# Patient Record
Sex: FEMALE | Race: WHITE | NOT HISPANIC OR LATINO | Employment: UNEMPLOYED | URBAN - METROPOLITAN AREA
[De-identification: names, ages, dates, MRNs, and addresses within clinical notes are randomized per-mention and may not be internally consistent; named-entity substitution may affect disease eponyms.]

---

## 2019-11-04 ENCOUNTER — OFFICE VISIT (OUTPATIENT)
Dept: UROLOGY | Facility: CLINIC | Age: 49
End: 2019-11-04
Payer: MEDICAID

## 2019-11-04 VITALS
HEIGHT: 65 IN | RESPIRATION RATE: 18 BRPM | WEIGHT: 117 LBS | SYSTOLIC BLOOD PRESSURE: 118 MMHG | DIASTOLIC BLOOD PRESSURE: 84 MMHG | HEART RATE: 81 BPM | BODY MASS INDEX: 19.49 KG/M2

## 2019-11-04 DIAGNOSIS — N20.1 URETEROLITHIASIS: Primary | ICD-10-CM

## 2019-11-04 PROCEDURE — 99204 OFFICE O/P NEW MOD 45 MIN: CPT | Mod: 25,S$GLB,, | Performed by: SPECIALIST

## 2019-11-04 PROCEDURE — 81000 POCT URINALYSIS (W/MICRO OPTION): ICD-10-PCS | Mod: S$GLB,,, | Performed by: SPECIALIST

## 2019-11-04 PROCEDURE — 81000 URINALYSIS NONAUTO W/SCOPE: CPT | Mod: S$GLB,,, | Performed by: SPECIALIST

## 2019-11-04 PROCEDURE — 99204 PR OFFICE/OUTPT VISIT, NEW, LEVL IV, 45-59 MIN: ICD-10-PCS | Mod: 25,S$GLB,, | Performed by: SPECIALIST

## 2019-11-04 RX ORDER — VALACYCLOVIR HYDROCHLORIDE 500 MG/1
500 TABLET, FILM COATED ORAL DAILY
Refills: 6 | COMMUNITY
Start: 2019-10-24

## 2019-11-04 RX ORDER — TAMSULOSIN HYDROCHLORIDE 0.4 MG/1
1 CAPSULE ORAL DAILY
Refills: 0 | COMMUNITY
Start: 2019-10-21 | End: 2021-05-06

## 2019-11-04 RX ORDER — ESTRADIOL 1 MG/1
1 TABLET ORAL DAILY
Refills: 6 | COMMUNITY
Start: 2019-10-25 | End: 2021-05-06

## 2019-11-04 NOTE — PROGRESS NOTES
Subjective:       Patient ID: Guillermina Rosas is a 49 y.o. female.    Chief Complaint: Other (kidney stone)      HPI:  49-year-old female has never had a history of nephrolithiasis presents with a new diagnosis of a 7 mm stone in the right UPJ.  She said that she saw some blood in the urine that prompted her to take a trip to the emergency department.  She did have some pain but was not severe.  Described the pain as a dull ache with occasional pains in the this component scale of 1-10 about a 6 not radiating to any other parts of the body mostly localized in the right flank.  No nausea no vomiting no other associated symptoms other than blood in the urine.    In emergency department she was evaluated with a radiographic imaging which confirmed a 7 mm stone in the right UPJ.  I do not have the disc or the report to verify today I am going by what the patient is reporting to me.    She has belly symptomatic at the moment.    Past Medical History:   Past Medical History:   Diagnosis Date    Arthritis     Chronic pain     Fibromyalgia     Neuropathy     Restless leg syndrome        Past Surgical Historical:   Past Surgical History:   Procedure Laterality Date     SECTION      HYSTERECTOMY      partial    RHINOPLASTY      TUBAL LIGATION          Medications:   Medication List with Changes/Refills   Current Medications    BUPROPION HCL (WELLBUTRIN ORAL)    Take by mouth.    ESTRADIOL (ESTRACE) 1 MG TABLET    Take 1 mg by mouth once daily.    TAMSULOSIN (FLOMAX) 0.4 MG CAP    Take 1 capsule by mouth once daily.    VALACYCLOVIR (VALTREX) 500 MG TABLET    Take 500 mg by mouth once daily.        Past Social History:   Social History     Socioeconomic History    Marital status:      Spouse name: Not on file    Number of children: Not on file    Years of education: Not on file    Highest education level: Not on file   Occupational History    Not on file   Social Needs    Financial resource strain:  Not on file    Food insecurity:     Worry: Not on file     Inability: Not on file    Transportation needs:     Medical: Not on file     Non-medical: Not on file   Tobacco Use    Smoking status: Current Every Day Smoker     Packs/day: 0.25     Types: Cigarettes    Tobacco comment: on wellbutrin    Substance and Sexual Activity    Alcohol use: Not Currently    Drug use: Never    Sexual activity: Yes   Lifestyle    Physical activity:     Days per week: Not on file     Minutes per session: Not on file    Stress: Not on file   Relationships    Social connections:     Talks on phone: Not on file     Gets together: Not on file     Attends [employer] service: Not on file     Active member of club or organization: Not on file     Attends meetings of clubs or organizations: Not on file     Relationship status: Not on file   Other Topics Concern    Not on file   Social History Narrative    Not on file       Allergies:   Review of patient's allergies indicates:   Allergen Reactions    Hydrocodone Rash        Family History:   Family History   Problem Relation Age of Onset    No Known Problems Father     Cancer Mother         Review of Systems:  Review of Systems - General ROS: negative  Psychological ROS: negative  Ophthalmic ROS: negative  ENT ROS: negative  Allergy and Immunology ROS: negative  Hematological and Lymphatic ROS: negative  Endocrine ROS: negative  Respiratory ROS: no cough, shortness of breath, or wheezing  Cardiovascular ROS: no chest pain or dyspnea on exertion  Gastrointestinal ROS: no abdominal pain, change in bowel habits, or black or bloody stools  Genito-Urinary ROS: positive for - right flank pain  Musculoskeletal ROS: negative  Neurological ROS: no TIA or stroke symptoms  Dermatological ROS: negative     Physical Exam:  General Appearance:    Alert, cooperative, no distress, appears stated age   Head:    Normocephalic, without obvious abnormality, atraumatic   Eyes:    PERRL,  conjunctiva/corneas clear, EOM's intact, fundi     benign, both eyes   Ears:    Normal TM's and external ear canals, both ears   Nose:   Nares normal, septum midline, mucosa normal, no drainage    or sinus tenderness   Throat:   Lips, mucosa, and tongue normal; teeth and gums normal   Neck:   Supple, symmetrical, trachea midline, no adenopathy;     thyroid:  no enlargement/tenderness/nodules; no carotid    bruit or JVD   Back:     Symmetric, no curvature, ROM normal, no CVA tenderness   Lungs:     Clear to auscultation bilaterally, respirations unlabored   Chest Wall:    No tenderness or deformity    Heart:    Regular rate and rhythm, S1 and S2 normal, no murmur, rub   or gallop   Breast Exam:    No tenderness, masses, or nipple abnormality   Abdomen:     Soft, non-tender, bowel sounds active all four quadrants,     no masses, no organomegaly   Genitalia:    Deferred   Rectal:    Deferred   Extremities:   Extremities normal, atraumatic, no cyanosis or edema   Pulses:   2+ and symmetric all extremities   Skin:   Skin color, texture, turgor normal, no rashes or lesions   Lymph nodes:   Cervical, supraclavicular, and axillary nodes normal   Neurologic:   CNII-XII intact, normal strength, sensation and reflexes     throughout         Assessment/Plan:       49-year-old female with a new diagnosis of an obstructing right proximal ureteral calculus.  1.  Patient is not symptomatic at all the moment so I am going to elect for conservative therapy for now.  2.  She is going to drop of the disc for me tomorrow for my review if after reviewing the disc have feel like we need to proceed in another fashion of going to call the patient  3.  Return to see me in clinic in several weeks.  4.  We talked about fluid hydration and relax in in the bath therapy and tub a look warm water when she gets home.    Problem List Items Addressed This Visit        Renal/    Ureterolithiasis - Primary

## 2019-11-11 LAB
BILIRUB UR QL STRIP: NEGATIVE
GLUCOSE UR QL STRIP: NEGATIVE
KETONES UR QL STRIP: NEGATIVE
LEUKOCYTE ESTERASE UR QL STRIP: NEGATIVE
PH, POC UA: 5.5
POC AMORP, URINE: 0
POC BACTI, URINE: 0
POC BLOOD, URINE: POSITIVE
POC CASTS, URINE: 0
POC CRYST, URINE: 0
POC EPITH, URINE: 0
POC HCG, URINE: 0
POC HYALIN, URINE: 0 LPF
POC MUCUS, URINE: 0
POC NITRATES, URINE: NEGATIVE
POC OTHER, URINE: 0
POC RBC, URINE: 0 HPF
POC WBC, URINE: 0 HPF
PROT UR QL STRIP: NEGATIVE
SP GR UR STRIP: 1 (ref 1–1.03)
UROBILINOGEN UR STRIP-ACNC: 0.2 (ref 0.1–1.1)

## 2019-11-20 ENCOUNTER — TELEPHONE (OUTPATIENT)
Dept: UROLOGY | Facility: CLINIC | Age: 49
End: 2019-11-20

## 2019-11-20 NOTE — TELEPHONE ENCOUNTER
Called and spoke with patient in regards to stone. Informed patient that stone would be observed with no intervention unless patient is experiencing symptoms or desires to have stone removed per Dr. Abdullahi. Patient verbalized severe discomfort and wants to have stone removed. Will notify Dr. Abdullahi.

## 2019-11-23 DIAGNOSIS — N20.1 URETEROLITHIASIS: Primary | ICD-10-CM

## 2019-11-26 ENCOUNTER — OUTSIDE PLACE OF SERVICE (OUTPATIENT)
Dept: UROLOGY | Facility: CLINIC | Age: 49
End: 2019-11-26
Payer: MEDICAID

## 2019-11-26 LAB
BASOPHILS NFR SNV MANUAL: 0.8 % (ref 0–3)
BUN SERPL-MCNC: 6 MG/DL (ref 7–18)
BUN/CREAT SERPL: 7.31 RATIO (ref 7–18)
CALCIUM SERPL-MCNC: 8.7 MG/DL (ref 8.8–10.5)
CHLORIDE SERPL-SCNC: 105 MMOL/L (ref 100–108)
CO2 SERPL-SCNC: 27 MMOL/L (ref 21–32)
CREAT SERPL-MCNC: 0.82 MG/DL (ref 0.55–1.02)
EOSINOPHIL NFR SNV MANUAL: 1.8 % (ref 1–3)
ERYTHROCYTE [DISTWIDTH] IN BLOOD BY AUTOMATED COUNT: 14.8 % (ref 12.5–18)
GFR ESTIMATION: > 60
GLUCOSE SERPL-MCNC: 82 MG/DL (ref 70–110)
HCT VFR BLD AUTO: 40 % (ref 37–47)
HGB BLD-MCNC: 13.3 G/DL (ref 12–16)
LYMPHOCYTES NFR SNV MANUAL: 36.7 % (ref 25–40)
MANUAL NRBC PER 100 CELLS: 0 %
MCH RBC QN AUTO: 30.5 PG (ref 27–31.2)
MCHC RBC AUTO-ENTMCNC: 33.3 G/DL (ref 31.8–35.4)
MCV RBC AUTO: 91.7 FL (ref 80–97)
MONOCYTES/100 LEUKOCYTES: 8.7 % (ref 1–15)
NEUTROPHILS NFR BLD: 3.09 10*3/UL (ref 1.8–7.7)
NEUTROPHILS NFR SNV MANUAL: 51.7 % (ref 37–80)
PLATELETS: 158 10*3/UL (ref 142–424)
POTASSIUM SERPL-SCNC: 4.8 MMOL/L (ref 3.6–5.2)
RBC # BLD AUTO: 4.36 10*6/UL (ref 4.2–5.4)
SODIUM BLD-SCNC: 140 MMOL/L (ref 135–145)
WBC # BLD: 6 10*3/UL (ref 4.6–10.2)

## 2019-11-26 PROCEDURE — 52356 PR CYSTO/URETERO W/LITHOTRIPSY: ICD-10-PCS | Mod: RT,,, | Performed by: SPECIALIST

## 2019-11-26 PROCEDURE — 74420 UROGRAPHY RTRGR +-KUB: CPT | Mod: 26,,, | Performed by: SPECIALIST

## 2019-11-26 PROCEDURE — 52356 CYSTO/URETERO W/LITHOTRIPSY: CPT | Mod: RT,,, | Performed by: SPECIALIST

## 2019-11-26 PROCEDURE — 74420 PR  X-RAY RETROGRADE PYELOGRAM: ICD-10-PCS | Mod: 26,,, | Performed by: SPECIALIST

## 2019-12-01 LAB
CALCULI COMPOSITION: NORMAL
CALCULI DESCRIPTION: NORMAL
CALCULI MASS: 51 MG
CALCULI NUMBER: 4
CALCULI PDF: NORMAL
CALCULI SIZE: NORMAL MM

## 2019-12-02 ENCOUNTER — TELEPHONE (OUTPATIENT)
Dept: UROLOGY | Facility: CLINIC | Age: 49
End: 2019-12-02

## 2019-12-02 NOTE — TELEPHONE ENCOUNTER
Pt had questions about removing stent herself, answered them and informed of what to do. Pt req we call out more pain meds since she is out and in pain/will be in pain when she removes stent. informed her I would ask ADAM but if not to take OTC pain reliever (tylenol, ibuprofen etc. )    ----- Message from Shanell Chinchilla sent at 12/2/2019 10:22 AM CST -----  Contact: pt  Pt has question about taking her stint out and also about more medication. Please call back pt [phone].

## 2019-12-04 ENCOUNTER — TELEPHONE (OUTPATIENT)
Dept: UROLOGY | Facility: CLINIC | Age: 49
End: 2019-12-04

## 2019-12-04 NOTE — TELEPHONE ENCOUNTER
Paperwork faxed to Ms. Carpio per request. Fax confirmation received. Otilia LEVIN    ----- Message from Kennedi Alvarez sent at 12/4/2019  8:37 AM CST -----  Contact: Mir  Ms. Carpio called In regards to get clinic notes fax over for the patient last visit . Fax number is [phone]. Please call back if needed at [phone]. Ext [phone].  Ms. Carpio stated she faxed over many request and never received anything .    Thanks,  Kennedi Alvarez

## 2020-01-09 ENCOUNTER — OFFICE VISIT (OUTPATIENT)
Dept: FAMILY MEDICINE | Facility: CLINIC | Age: 50
End: 2020-01-09
Payer: MEDICAID

## 2020-01-09 VITALS
TEMPERATURE: 98 F | BODY MASS INDEX: 19.74 KG/M2 | HEIGHT: 65 IN | WEIGHT: 118.5 LBS | OXYGEN SATURATION: 99 % | SYSTOLIC BLOOD PRESSURE: 115 MMHG | RESPIRATION RATE: 18 BRPM | HEART RATE: 70 BPM | DIASTOLIC BLOOD PRESSURE: 72 MMHG

## 2020-01-09 DIAGNOSIS — B18.1 CHRONIC VIRAL HEPATITIS B WITHOUT DELTA AGENT AND WITHOUT COMA: ICD-10-CM

## 2020-01-09 DIAGNOSIS — B18.2 HEP C W/O COMA, CHRONIC: Primary | ICD-10-CM

## 2020-01-09 PROCEDURE — 99203 PR OFFICE/OUTPT VISIT, NEW, LEVL III, 30-44 MIN: ICD-10-PCS | Mod: S$GLB,,, | Performed by: INTERNAL MEDICINE

## 2020-01-09 PROCEDURE — 99203 OFFICE O/P NEW LOW 30 MIN: CPT | Mod: S$GLB,,, | Performed by: INTERNAL MEDICINE

## 2020-01-09 NOTE — PROGRESS NOTES
Subjective:       Patient ID: Guillermina Rosas is a 49 y.o. female.    Chief Complaint: Hepatitis C (INITIAL CONSULTATION )    Asked to see regarding hepatitis-C.  Patient reports that she was diagnosed in approximately 2013.  She reports that she had a family friend who was hep C positive that she helped with wound care without gloves and thinks that this may have been how she was exposed.  Her  was also positive for hepatitis-C.  She has never been treated in the past.  She had a viral load of 21,000,000, genotype 3.  Lab testing did not show any evidence of cirrhosis, she was activity stage III, fibrosis stage I.  She denies any known history of complications related to this.  No history of GI bleeding, no history of jaundice or scleral icterus.  She also has history of kidney stones status post recent urologic procedure.  She denies any pain at this time.  No recent fever chills.  She is very interested in treatment and getting started on this as soon as possible.    Her lab testing also showed an incidental finding of a positive hepatitis B surface antigen.  She denies any prior knowledge of being diagnosed with hepatitis-B.  She denies any known exposures to hepatitis-B.  She is interested in treatment for this if it is appropriate.    Review of Systems   Constitutional: Negative for chills and fever.   HENT: Negative for ear pain, rhinorrhea and sore throat.    Eyes: Negative for pain and visual disturbance.   Respiratory: Negative for cough, shortness of breath and wheezing.    Cardiovascular: Negative for chest pain and palpitations.   Gastrointestinal: Negative for abdominal pain, constipation, diarrhea, nausea and vomiting.   Endocrine: Negative for cold intolerance and heat intolerance.   Genitourinary: Negative for difficulty urinating, dysuria and hematuria.   Musculoskeletal: Negative for back pain, joint swelling and myalgias.   Skin: Negative for rash and wound.   Neurological: Negative for  dizziness, weakness and headaches.   Psychiatric/Behavioral: Negative for agitation and confusion.       Objective:      Physical Exam   Constitutional: She is oriented to person, place, and time. She appears well-developed and well-nourished.   HENT:   Head: Normocephalic and atraumatic.   Eyes: Pupils are equal, round, and reactive to light. No scleral icterus.   Neck: Neck supple. No tracheal deviation present.   Cardiovascular: Normal rate, regular rhythm and normal heart sounds.   No murmur heard.  Pulmonary/Chest: Effort normal and breath sounds normal. No respiratory distress. She has no wheezes.   Abdominal: Soft. Bowel sounds are normal. She exhibits no distension. There is no tenderness. There is no guarding.   Musculoskeletal: Normal range of motion. She exhibits no edema or tenderness.   Neurological: She is alert and oriented to person, place, and time.   Skin: Skin is warm and dry. No rash noted.   Psychiatric: She has a normal mood and affect. Her behavior is normal.   Nursing note and vitals reviewed.      Assessment:       1. Hep C w/o coma, chronic    2. Chronic viral hepatitis B without delta agent and without coma        Plan:       Problem List Items Addressed This Visit        GI    Hep C w/o coma, chronic - Primary     Genotype 3, viral load over 21,000,000. Activity stage III fibrosis stage I.  Will start treatment with Epclusa.  Discussed importance of 100% compliance with therapy in order to have successful treatment.  We will check labs senior living through therapy, if he has not had complete response will stop therapy at that time.  Will need to treat for 12 weeks.         Chronic viral hepatitis B without delta agent and without coma     Need to get more testing including viral load, E antigen to see if this is accurate versus a false positive.  Will likely discuss treatment if needed after hepatitis C treatment is complete.         Relevant Orders    Hepatitis B Viral DNA, Quantitative     Hepatitis B surface antibody    Hepatitis B e antigen

## 2020-01-09 NOTE — ASSESSMENT & PLAN NOTE
Genotype 3, viral load over 21,000,000. Activity stage III fibrosis stage I.  Will start treatment with Epclusa.  Discussed importance of 100% compliance with therapy in order to have successful treatment.  We will check labs USP through therapy, if he has not had complete response will stop therapy at that time.  Will need to treat for 12 weeks.

## 2020-01-09 NOTE — ASSESSMENT & PLAN NOTE
Need to get more testing including viral load, E antigen to see if this is accurate versus a false positive.  Will likely discuss treatment if needed after hepatitis C treatment is complete.

## 2020-01-09 NOTE — LETTER
January 9, 2020      Rufina Galvan, MIGUEL  [address] [address]           [place] - [facility]/Infectious Disease  [facility] [facility] [address]  Phone: [phone]  Fax: [phone]          Patient: Guillermina Rosas   MR Number: [id number]   YOB: 1970   Date of Visit: 1/9/2020       Dear Rufina Galvan:    Thank you for referring Guillermina Rosas to me for evaluation. Attached you will find relevant portions of my assessment and plan of care.    If you have questions, please do not hesitate to call me. I look forward to following Guillermina Rosas along with you.    Sincerely,    Maye Garland, LPN    Enclosure  CC:  No Recipients    If you would like to receive this communication electronically, please contact [email or web address] or [phone] to request more information on [employer] Link access.    For providers and/or their staff who would like to refer a patient to [facility], please contact us through our one-stop-shop provider referral line, [facility], at [phone].    If you feel you have received this communication in error or would no longer like to receive these types of communications, please e-mail [email or web address]

## 2020-06-18 ENCOUNTER — TELEPHONE (OUTPATIENT)
Dept: FAMILY MEDICINE | Facility: CLINIC | Age: 50
End: 2020-06-18

## 2020-06-18 NOTE — TELEPHONE ENCOUNTER
Spoke w/ ORI nurse and she stated pt wants to get her labs done that were sent out in January. Tried calling pt's cell and home # to verify what lab she wants to use.

## 2021-05-06 ENCOUNTER — OFFICE VISIT (OUTPATIENT)
Dept: FAMILY MEDICINE | Facility: CLINIC | Age: 51
End: 2021-05-06
Payer: MEDICAID

## 2021-05-06 VITALS
RESPIRATION RATE: 16 BRPM | HEART RATE: 99 BPM | DIASTOLIC BLOOD PRESSURE: 80 MMHG | WEIGHT: 136 LBS | SYSTOLIC BLOOD PRESSURE: 126 MMHG | OXYGEN SATURATION: 99 % | BODY MASS INDEX: 22.63 KG/M2

## 2021-05-06 DIAGNOSIS — G89.29 CHRONIC NONINTRACTABLE HEADACHE, UNSPECIFIED HEADACHE TYPE: ICD-10-CM

## 2021-05-06 DIAGNOSIS — B18.1 CHRONIC VIRAL HEPATITIS B WITHOUT DELTA AGENT AND WITHOUT COMA: Primary | ICD-10-CM

## 2021-05-06 DIAGNOSIS — B18.2 HEP C W/O COMA, CHRONIC: ICD-10-CM

## 2021-05-06 DIAGNOSIS — R51.9 CHRONIC NONINTRACTABLE HEADACHE, UNSPECIFIED HEADACHE TYPE: ICD-10-CM

## 2021-05-06 DIAGNOSIS — M54.31 SCIATIC NERVE PAIN, RIGHT: ICD-10-CM

## 2021-05-06 DIAGNOSIS — M54.41 RIGHT-SIDED LOW BACK PAIN WITH RIGHT-SIDED SCIATICA, UNSPECIFIED CHRONICITY: ICD-10-CM

## 2021-05-06 DIAGNOSIS — G62.9 NEUROPATHY: ICD-10-CM

## 2021-05-06 DIAGNOSIS — N31.9 BLADDER DYSFUNCTION: ICD-10-CM

## 2021-05-06 PROCEDURE — 99205 PR OFFICE/OUTPT VISIT, NEW, LEVL V, 60-74 MIN: ICD-10-PCS | Mod: S$GLB,,, | Performed by: NURSE PRACTITIONER

## 2021-05-06 PROCEDURE — 99205 OFFICE O/P NEW HI 60 MIN: CPT | Mod: S$GLB,,, | Performed by: NURSE PRACTITIONER

## 2021-05-06 RX ORDER — GABAPENTIN 600 MG/1
TABLET ORAL
COMMUNITY
Start: 2021-04-23

## 2021-05-06 RX ORDER — METHYLPREDNISOLONE ACETATE 80 MG/ML
80 INJECTION, SUSPENSION INTRA-ARTICULAR; INTRALESIONAL; INTRAMUSCULAR; SOFT TISSUE
Status: COMPLETED | OUTPATIENT
Start: 2021-05-06 | End: 2021-05-06

## 2021-05-06 RX ORDER — CYCLOBENZAPRINE HCL 10 MG
10 TABLET ORAL 3 TIMES DAILY PRN
Qty: 30 TABLET | Refills: 0 | Status: SHIPPED | OUTPATIENT
Start: 2021-05-06 | End: 2021-05-18

## 2021-05-06 RX ORDER — ALPRAZOLAM 2 MG/1
2 TABLET ORAL EVERY 8 HOURS PRN
COMMUNITY
Start: 2021-03-23

## 2021-05-06 RX ADMIN — METHYLPREDNISOLONE ACETATE 80 MG: 80 INJECTION, SUSPENSION INTRA-ARTICULAR; INTRALESIONAL; INTRAMUSCULAR; SOFT TISSUE at 11:05

## 2021-05-20 ENCOUNTER — OFFICE VISIT (OUTPATIENT)
Dept: FAMILY MEDICINE | Facility: CLINIC | Age: 51
End: 2021-05-20
Payer: MEDICAID

## 2021-05-20 VITALS
HEART RATE: 72 BPM | RESPIRATION RATE: 14 BRPM | OXYGEN SATURATION: 99 % | SYSTOLIC BLOOD PRESSURE: 114 MMHG | DIASTOLIC BLOOD PRESSURE: 65 MMHG

## 2021-05-20 DIAGNOSIS — B18.2 HEP C W/O COMA, CHRONIC: Primary | ICD-10-CM

## 2021-05-20 DIAGNOSIS — B18.1 CHRONIC VIRAL HEPATITIS B WITHOUT DELTA AGENT AND WITHOUT COMA: ICD-10-CM

## 2021-05-20 PROCEDURE — 99213 OFFICE O/P EST LOW 20 MIN: CPT | Mod: S$GLB,,, | Performed by: INTERNAL MEDICINE

## 2021-05-20 PROCEDURE — 99213 PR OFFICE/OUTPT VISIT, EST, LEVL III, 20-29 MIN: ICD-10-PCS | Mod: S$GLB,,, | Performed by: INTERNAL MEDICINE

## 2021-05-20 RX ORDER — VELPATASVIR AND SOFOSBUVIR 100; 400 MG/1; MG/1
1 TABLET, FILM COATED ORAL DAILY
Qty: 28 TABLET | Refills: 2 | Status: SHIPPED | OUTPATIENT
Start: 2021-05-20 | End: 2021-06-17

## 2021-05-21 ENCOUNTER — OFFICE VISIT (OUTPATIENT)
Dept: FAMILY MEDICINE | Facility: CLINIC | Age: 51
End: 2021-05-21
Payer: MEDICAID

## 2021-05-21 VITALS
RESPIRATION RATE: 14 BRPM | OXYGEN SATURATION: 96 % | SYSTOLIC BLOOD PRESSURE: 115 MMHG | DIASTOLIC BLOOD PRESSURE: 67 MMHG | HEART RATE: 80 BPM

## 2021-05-21 DIAGNOSIS — F41.9 ANXIETY: ICD-10-CM

## 2021-05-21 DIAGNOSIS — F31.9 BIPOLAR AFFECTIVE DISORDER, REMISSION STATUS UNSPECIFIED: Primary | ICD-10-CM

## 2021-05-21 DIAGNOSIS — F11.20 LONG-TERM CURRENT USE OF METHADONE FOR OPIATE DEPENDENCE: ICD-10-CM

## 2021-05-21 DIAGNOSIS — Z09 FOLLOW-UP EXAM: ICD-10-CM

## 2021-05-21 PROCEDURE — 99214 PR OFFICE/OUTPT VISIT, EST, LEVL IV, 30-39 MIN: ICD-10-PCS | Mod: S$GLB,,, | Performed by: NURSE PRACTITIONER

## 2021-05-21 PROCEDURE — 99214 OFFICE O/P EST MOD 30 MIN: CPT | Mod: S$GLB,,, | Performed by: NURSE PRACTITIONER

## 2021-05-31 ENCOUNTER — TELEPHONE (OUTPATIENT)
Dept: FAMILY MEDICINE | Facility: CLINIC | Age: 51
End: 2021-05-31

## 2021-06-22 ENCOUNTER — TELEPHONE (OUTPATIENT)
Dept: FAMILY MEDICINE | Facility: CLINIC | Age: 51
End: 2021-06-22

## 2021-06-25 ENCOUNTER — TELEPHONE (OUTPATIENT)
Dept: FAMILY MEDICINE | Facility: CLINIC | Age: 51
End: 2021-06-25

## 2022-01-28 ENCOUNTER — TELEPHONE (OUTPATIENT)
Dept: SMOKING CESSATION | Facility: CLINIC | Age: 52
End: 2022-01-28
Payer: MEDICAID

## 2022-01-28 ENCOUNTER — CLINICAL SUPPORT (OUTPATIENT)
Dept: SMOKING CESSATION | Facility: CLINIC | Age: 52
End: 2022-01-28
Payer: COMMERCIAL

## 2022-01-28 DIAGNOSIS — F17.200 NICOTINE DEPENDENCE: Primary | ICD-10-CM

## 2022-01-28 PROCEDURE — 99404 PR PREVENT COUNSEL,INDIV,60 MIN: ICD-10-PCS | Mod: S$GLB,,, | Performed by: GENERAL PRACTICE

## 2022-01-28 PROCEDURE — 99404 PREV MED CNSL INDIV APPRX 60: CPT | Mod: S$GLB,,, | Performed by: GENERAL PRACTICE

## 2022-01-28 RX ORDER — MICONAZOLE NITRATE 2 %
2 CREAM (GRAM) TOPICAL
Qty: 144 EACH | Refills: 0 | Status: SHIPPED | OUTPATIENT
Start: 2022-01-28

## 2022-01-28 RX ORDER — METHADONE HYDROCHLORIDE 10 MG/1
60 TABLET ORAL DAILY
COMMUNITY

## 2022-01-28 RX ORDER — BUPROPION HYDROCHLORIDE 150 MG/1
150 TABLET, EXTENDED RELEASE ORAL 2 TIMES DAILY
Qty: 60 TABLET | Refills: 0 | Status: SHIPPED | OUTPATIENT
Start: 2022-01-28 | End: 2023-01-28

## 2022-01-28 NOTE — TELEPHONE ENCOUNTER
Attempted to contact patient regarding missed [facility] appointment.  Patient's phone stated that she was not receiving calls at this time.

## 2022-02-04 ENCOUNTER — CLINICAL SUPPORT (OUTPATIENT)
Dept: SMOKING CESSATION | Facility: CLINIC | Age: 52
End: 2022-02-04
Payer: COMMERCIAL

## 2022-02-04 DIAGNOSIS — F17.200 NICOTINE DEPENDENCE: Primary | ICD-10-CM

## 2022-02-04 PROCEDURE — 99403 PR PREVENT COUNSEL,INDIV,45 MIN: ICD-10-PCS | Mod: S$GLB,,, | Performed by: GENERAL PRACTICE

## 2022-02-04 PROCEDURE — 99403 PREV MED CNSL INDIV APPRX 45: CPT | Mod: S$GLB,,, | Performed by: GENERAL PRACTICE

## 2022-02-04 NOTE — PROGRESS NOTES
Individual Follow-Up Form    2/4/2022    Clinical Status of Patient: Outpatient    Length of Service: 45 minutes    Continuing Medication: yes  Wellbutrin    Other Medications: Nicotine Gum     Target Symptoms: Withdrawal and medication side effects. The following were  rated moderate (3) to severe (4) on TCRS:  · Moderate (3): none reported  · Severe (4): none reported    Comments: Spoke with patient at length in clinic regarding tobacco cessation progress. Patient remains on prescribed tobacco cessation medication 150mg Wellbutrin and 2mg nicotine gum without any negative side effects at this time.  Patient decreased to 3 cigarettes daily.  Patient will cut down to 2 cigarettes a day this week.  Counselor reviewed strategies, cues, and triggers. Introduced the negative impact of tobacco on health, the health advantages of discontinuing the use of tobacco, time line improved health changes after a quit, withdrawal issues to expect from nicotine and habit, and ways to achieve the goal of a quit.  Counselor provided patient with fidget devices to help occupy her time and keep her hands busy to deter her from smoking.  Patient appeared to be receptive to the information given.  Counselor will continue to motivate patient to be tobacco free.    Diagnosis: F17.200    Next Visit: 1 week

## 2022-02-09 ENCOUNTER — OFFICE VISIT (OUTPATIENT)
Dept: UROLOGY | Facility: CLINIC | Age: 52
End: 2022-02-09
Payer: MEDICAID

## 2022-02-09 VITALS — HEIGHT: 65 IN | BODY MASS INDEX: 21.99 KG/M2 | WEIGHT: 132 LBS

## 2022-02-09 DIAGNOSIS — N39.46 MIXED INCONTINENCE URGE AND STRESS: Primary | ICD-10-CM

## 2022-02-09 LAB — POC RESIDUAL URINE VOLUME: 0 ML (ref 0–100)

## 2022-02-09 PROCEDURE — 1159F MED LIST DOCD IN RCRD: CPT | Mod: CPTII,S$GLB,, | Performed by: NURSE PRACTITIONER

## 2022-02-09 PROCEDURE — 3008F BODY MASS INDEX DOCD: CPT | Mod: CPTII,S$GLB,, | Performed by: NURSE PRACTITIONER

## 2022-02-09 PROCEDURE — 1160F PR REVIEW ALL MEDS BY PRESCRIBER/CLIN PHARMACIST DOCUMENTED: ICD-10-PCS | Mod: CPTII,S$GLB,, | Performed by: NURSE PRACTITIONER

## 2022-02-09 PROCEDURE — 99214 PR OFFICE/OUTPT VISIT, EST, LEVL IV, 30-39 MIN: ICD-10-PCS | Mod: S$GLB,,, | Performed by: NURSE PRACTITIONER

## 2022-02-09 PROCEDURE — 51798 US URINE CAPACITY MEASURE: CPT | Mod: S$GLB,,, | Performed by: NURSE PRACTITIONER

## 2022-02-09 PROCEDURE — 51798 POCT BLADDER SCAN: ICD-10-PCS | Mod: S$GLB,,, | Performed by: NURSE PRACTITIONER

## 2022-02-09 PROCEDURE — 1159F PR MEDICATION LIST DOCUMENTED IN MEDICAL RECORD: ICD-10-PCS | Mod: CPTII,S$GLB,, | Performed by: NURSE PRACTITIONER

## 2022-02-09 PROCEDURE — 99214 OFFICE O/P EST MOD 30 MIN: CPT | Mod: S$GLB,,, | Performed by: NURSE PRACTITIONER

## 2022-02-09 PROCEDURE — 1160F RVW MEDS BY RX/DR IN RCRD: CPT | Mod: CPTII,S$GLB,, | Performed by: NURSE PRACTITIONER

## 2022-02-09 PROCEDURE — 3008F PR BODY MASS INDEX (BMI) DOCUMENTED: ICD-10-PCS | Mod: CPTII,S$GLB,, | Performed by: NURSE PRACTITIONER

## 2022-02-09 RX ORDER — SOLIFENACIN SUCCINATE 10 MG/1
10 TABLET, FILM COATED ORAL DAILY
Qty: 30 TABLET | Refills: 11 | Status: SHIPPED | OUTPATIENT
Start: 2022-02-09 | End: 2023-02-09

## 2022-02-09 NOTE — PROGRESS NOTES
"Subjective:       Patient ID: Guillermina Rosas is a 51 y.o. female.    Chief Complaint: urinary incontience      HPI: 51-year-old patient established known to Dr. Abdullahi she had a previous right ureteral stone that he did stone extraction on back in 2019.  She has had no recurrence stone type symptoms until this past week in which she states she passed a small kidney stone.  She is asymptomatic in reference to that today.  Her main reason for evaluation today is mixed urinary urge/stress incontinence.  Her last gyn exam was a year ago.  Her gyn states she had a bladder prolapse and needed to see a urologist.  On presentation today states her symptoms been ongoing for years.  She has urinary frequency urgency with urge incontinence she also has incontinence with cough laugh sneeze and position change.  She wears 6-7 pads per day.  She wait awakens wet daily.  Denies any dysuria.  No gross hematuria.  No flank pain.  Mild constipation.  Patient states her to gyn in previously tried her on oxybutynin and failed.  Gyn history  2 para 2 1 vaginal delivery/1        Past Medical History:   Past Medical History:   Diagnosis Date    Arthritis     Chronic pain     Chronic pain     Fibromyalgia     Neuropathy     Restless leg syndrome     Restless leg syndrome     Scoliosis        Past Surgical Historical:   Past Surgical History:   Procedure Laterality Date     SECTION  2003    CYSTOSCOPY WITH CALCULUS EXTRACTION Right 2019    HYSTERECTOMY      partial    RHINOPLASTY      RHINOPLASTY      3 "NOSE SURGERIES"    TUBAL LIGATION          Medications:   Medication List with Changes/Refills   New Medications    SOLIFENACIN (VESICARE) 10 MG TABLET    Take 1 tablet (10 mg total) by mouth once daily.   Current Medications    ALPRAZOLAM (XANAX) 2 MG TAB    Take 2 mg by mouth every 8 (eight) hours as needed.    BUPROPION (WELLBUTRIN SR) 150 MG TBSR 12 HR TABLET    Take 1 tablet (150 mg total) by " mouth 2 (two) times daily.    CYCLOBENZAPRINE (FLEXERIL) 10 MG TABLET    TAKE ONE TABLET BY MOUTH THREE TIMES DAILY AS NEEDED FOR MUSCLE SPASMS    GABAPENTIN (NEURONTIN) 600 MG TABLET    TAKE ONE TABLET BY MOUTH THREE TIMES DAILY FOR 30 DAYS    METHADONE (DOLOPHINE) 10 MG TABLET    Take 60 mg by mouth Daily.    NICOTINE, POLACRILEX, (NICORETTE) 2 MG GUM    Take 1 each (2 mg total) by mouth as needed (Do not take more than 10 pieces in 24 hours).    VALACYCLOVIR (VALTREX) 500 MG TABLET    Take 500 mg by mouth once daily.        Past Social History:   Social History     Socioeconomic History    Marital status:     Number of children: 2   Occupational History    Occupation: NA    Tobacco Use    Smoking status: Current Every Day Smoker     Packs/day: 0.25     Types: Cigarettes    Smokeless tobacco: Never Used    Tobacco comment: on wellbutrin DOWN TO 8/DAY    Substance and Sexual Activity    Alcohol use: Not Currently    Drug use: Never    Sexual activity: Yes       Allergies:   Review of patient's allergies indicates:   Allergen Reactions    Hydrocodone Rash        Family History:   Family History   Problem Relation Age of Onset    Heart disease Father     Diabetes Father     Cancer Mother     Throat cancer Mother     Diabetes Paternal Aunt     Diabetes Paternal Uncle     Lymphoma Maternal Grandmother         Review of Systems:  Review of Systems   Constitutional: Negative for activity change and appetite change.   HENT: Negative for congestion and dental problem.    Respiratory: Negative for chest tightness and shortness of breath.    Cardiovascular: Negative for chest pain.   Gastrointestinal: Negative for abdominal distention and abdominal pain.   Genitourinary: Positive for enuresis. Negative for decreased urine volume, difficulty urinating, dyspareunia, dysuria, flank pain, frequency, genital sores, hematuria, pelvic pain and urgency.   Musculoskeletal: Negative for back pain and neck pain.    Allergic/Immunologic: Negative for immunocompromised state.   Neurological: Negative for dizziness.   Hematological: Negative for adenopathy.   Psychiatric/Behavioral: Negative for agitation, behavioral problems and confusion.       Physical Exam:  Physical Exam  Constitutional:       Appearance: She is well-developed and well-nourished.   HENT:      Head: Normocephalic and atraumatic.   Eyes:      General: No scleral icterus.  Cardiovascular:      Pulses: Intact distal pulses.   Pulmonary:      Effort: Pulmonary effort is normal.      Breath sounds: Normal breath sounds.   Abdominal:      General: There is no distension.      Palpations: Abdomen is soft.      Tenderness: There is no abdominal tenderness.   Genitourinary:     Exam position: Supine.      Labia:         Right: No rash, tenderness or lesion.         Left: No rash, tenderness or lesion.       Vagina: Normal.      Rectum: Normal.      Comments: Deferred at this time  Musculoskeletal:         General: No edema.      Cervical back: Normal range of motion.   Skin:     General: Skin is warm and dry.   Neurological:      Mental Status: She is alert and oriented to person, place, and time.   Psychiatric:         Mood and Affect: Mood and affect normal.         Assessment/Plan:   Mixed incontinence--urinalysis is negative, PVR 0 mL.  Patient is failed oxybutynin in the care of her gyn.  Trial of VESIcare 10 mg 1 p.o. daily Rx sent to pharmacy of record.  I explained patient I would like to get her urge component of the incontinence under control and then re-evaluate how severe the stress component is.  If that remains I at that time I will schedule her for cystoscopy and pelvic exam in the care of Dr. Hinton as a possible candidate for surgical  Problem List Items Addressed This Visit    None     Visit Diagnoses     Mixed incontinence urge and stress    -  Primary    Relevant Orders    POCT Bladder Scan    POCT Urinalysis (w/Micro Option)

## 2022-02-25 ENCOUNTER — TELEPHONE (OUTPATIENT)
Dept: SMOKING CESSATION | Facility: CLINIC | Age: 52
End: 2022-02-25
Payer: MEDICAID

## 2022-02-25 NOTE — TELEPHONE ENCOUNTER
Attempted to contact patient regarding her missed follow up appointment.  Patient did not answer.  Counselor left a voice message with contact information.

## 2022-03-04 ENCOUNTER — TELEPHONE (OUTPATIENT)
Dept: SMOKING CESSATION | Facility: CLINIC | Age: 52
End: 2022-03-04
Payer: MEDICAID

## 2022-03-04 NOTE — TELEPHONE ENCOUNTER
Attempted to contact patient regarding missed follow up appointment.  Patient did not answer.  Counselor was unable to leave a voice message.

## 2022-03-07 ENCOUNTER — CLINICAL SUPPORT (OUTPATIENT)
Dept: SMOKING CESSATION | Facility: CLINIC | Age: 52
End: 2022-03-07
Payer: COMMERCIAL

## 2022-03-07 DIAGNOSIS — F17.200 NICOTINE DEPENDENCE: Primary | ICD-10-CM

## 2022-03-07 PROCEDURE — 99406 BEHAV CHNG SMOKING 3-10 MIN: CPT | Mod: S$GLB,,, | Performed by: GENERAL PRACTICE

## 2022-03-07 PROCEDURE — 99406 PR TOBACCO USE CESSATION INTERMEDIATE 3-10 MINUTES: ICD-10-PCS | Mod: S$GLB,,, | Performed by: GENERAL PRACTICE

## 2022-05-18 ENCOUNTER — CLINICAL SUPPORT (OUTPATIENT)
Dept: SMOKING CESSATION | Facility: CLINIC | Age: 52
End: 2022-05-18
Payer: COMMERCIAL

## 2022-05-18 DIAGNOSIS — F17.200 NICOTINE DEPENDENCE: Primary | ICD-10-CM

## 2022-05-18 PROCEDURE — 99999 PR PBB SHADOW E&M-EST. PATIENT-LVL I: ICD-10-PCS | Mod: PBBFAC,,,

## 2022-05-18 PROCEDURE — 99999 PR PBB SHADOW E&M-EST. PATIENT-LVL I: CPT | Mod: PBBFAC,,,

## 2022-05-18 PROCEDURE — 99407 PR TOBACCO USE CESSATION INTENSIVE >10 MINUTES: ICD-10-PCS | Mod: S$PBB,,,

## 2022-05-18 PROCEDURE — 99407 BEHAV CHNG SMOKING > 10 MIN: CPT | Mod: S$PBB,,,

## 2022-05-18 NOTE — PROGRESS NOTES
Called pt to f/u on her 3 month smoking cessation quit status. Pt's  stated they are still smoking. Informed him they have benefits available and are able to rejoin. Pt scheduled appointment for quit #2 for him and his wife on 5/24/22 at 8 am. Informed him of benefit period, phone follow ups, and contact information. Will complete smart form for 3 months and will continue to follow up on on quit #1 and #2 episode.

## 2022-05-24 ENCOUNTER — TELEPHONE (OUTPATIENT)
Dept: SMOKING CESSATION | Facility: CLINIC | Age: 52
End: 2022-05-24
Payer: MEDICAID

## 2022-05-24 NOTE — TELEPHONE ENCOUNTER
Spoke with patient's  regarding patient's scheduled appointment. Patient's appointment recsheduled for 5/31/2022 @ 4PM.

## 2022-05-31 ENCOUNTER — TELEPHONE (OUTPATIENT)
Dept: SMOKING CESSATION | Facility: CLINIC | Age: 52
End: 2022-05-31
Payer: MEDICAID

## 2022-05-31 NOTE — TELEPHONE ENCOUNTER
Spoke with patient regarding her missed [facility] appointment.  Patient stated that she is dealing with a family matter and won't be able to attend her appointment.  Patient requested a call next week to reschedule appointment.

## 2022-08-18 ENCOUNTER — TELEPHONE (OUTPATIENT)
Dept: SMOKING CESSATION | Facility: CLINIC | Age: 52
End: 2022-08-18
Payer: MEDICAID

## 2022-08-18 NOTE — TELEPHONE ENCOUNTER
1st attempt regarding smoking cessation quit 1 episode.  Unable to leave a voice message, pt phone not in service.

## 2023-01-30 ENCOUNTER — TELEPHONE (OUTPATIENT)
Dept: SMOKING CESSATION | Facility: CLINIC | Age: 53
End: 2023-01-30
Payer: MEDICAID

## 2023-01-30 NOTE — TELEPHONE ENCOUNTER
1st attempt, patient called in regards to 12 month f/u for quit 1 with Smoking Cessation.  Voicemail not available, number not in service.

## 2025-04-10 ENCOUNTER — TELEPHONE (OUTPATIENT)
Dept: UROLOGY | Facility: CLINIC | Age: 55
End: 2025-04-10
Payer: MEDICAID